# Patient Record
Sex: MALE | Race: WHITE | NOT HISPANIC OR LATINO | Employment: FULL TIME | URBAN - METROPOLITAN AREA
[De-identification: names, ages, dates, MRNs, and addresses within clinical notes are randomized per-mention and may not be internally consistent; named-entity substitution may affect disease eponyms.]

---

## 2017-02-16 ENCOUNTER — LAB VISIT (OUTPATIENT)
Dept: LAB | Facility: HOSPITAL | Age: 32
End: 2017-02-16
Attending: INTERNAL MEDICINE
Payer: COMMERCIAL

## 2017-02-16 DIAGNOSIS — E03.9 ACQUIRED HYPOTHYROIDISM: ICD-10-CM

## 2017-02-16 LAB
T4 FREE SERPL-MCNC: 1.04 NG/DL
TSH SERPL DL<=0.005 MIU/L-ACNC: 5.19 UIU/ML

## 2017-02-16 PROCEDURE — 36415 COLL VENOUS BLD VENIPUNCTURE: CPT

## 2017-02-16 PROCEDURE — 84439 ASSAY OF FREE THYROXINE: CPT

## 2017-02-16 PROCEDURE — 84443 ASSAY THYROID STIM HORMONE: CPT

## 2017-02-16 PROCEDURE — 86376 MICROSOMAL ANTIBODY EACH: CPT

## 2017-02-17 ENCOUNTER — PATIENT MESSAGE (OUTPATIENT)
Dept: ENDOCRINOLOGY | Facility: CLINIC | Age: 32
End: 2017-02-17

## 2017-02-17 LAB — THYROPEROXIDASE IGG SERPL-ACNC: 1121.6 IU/ML

## 2017-02-20 RX ORDER — LEVOTHYROXINE SODIUM 175 UG/1
175 TABLET ORAL DAILY
Qty: 90 TABLET | Refills: 0 | Status: SHIPPED | OUTPATIENT
Start: 2017-02-20 | End: 2017-03-20

## 2017-03-20 ENCOUNTER — OFFICE VISIT (OUTPATIENT)
Dept: ENDOCRINOLOGY | Facility: CLINIC | Age: 32
End: 2017-03-20
Payer: COMMERCIAL

## 2017-03-20 VITALS
BODY MASS INDEX: 31.12 KG/M2 | SYSTOLIC BLOOD PRESSURE: 124 MMHG | HEART RATE: 62 BPM | DIASTOLIC BLOOD PRESSURE: 68 MMHG | HEIGHT: 72 IN | WEIGHT: 229.75 LBS

## 2017-03-20 DIAGNOSIS — L50.9 URTICARIA: ICD-10-CM

## 2017-03-20 DIAGNOSIS — E06.3 HYPOTHYROIDISM DUE TO HASHIMOTO'S THYROIDITIS: ICD-10-CM

## 2017-03-20 DIAGNOSIS — T78.3XXD ANGIOEDEMA, SUBSEQUENT ENCOUNTER: ICD-10-CM

## 2017-03-20 DIAGNOSIS — E06.3 HASHIMOTO'S THYROIDITIS: Primary | ICD-10-CM

## 2017-03-20 DIAGNOSIS — E03.8 HYPOTHYROIDISM DUE TO HASHIMOTO'S THYROIDITIS: ICD-10-CM

## 2017-03-20 PROCEDURE — 1160F RVW MEDS BY RX/DR IN RCRD: CPT | Mod: S$GLB,,, | Performed by: INTERNAL MEDICINE

## 2017-03-20 PROCEDURE — 99999 PR PBB SHADOW E&M-EST. PATIENT-LVL III: CPT | Mod: PBBFAC,,, | Performed by: INTERNAL MEDICINE

## 2017-03-20 PROCEDURE — 99214 OFFICE O/P EST MOD 30 MIN: CPT | Mod: S$GLB,,, | Performed by: INTERNAL MEDICINE

## 2017-03-20 RX ORDER — LEVOTHYROXINE SODIUM 200 UG/1
200 TABLET ORAL DAILY
Qty: 90 TABLET | Refills: 3 | Status: SHIPPED | OUTPATIENT
Start: 2017-03-20 | End: 2018-03-20

## 2017-03-20 RX ORDER — LEVOTHYROXINE SODIUM 175 UG/1
175 TABLET ORAL DAILY
Qty: 90 TABLET | Refills: 3 | Status: CANCELLED | OUTPATIENT
Start: 2017-03-20 | End: 2018-03-20

## 2017-03-20 NOTE — PROGRESS NOTES
Subjective:      Patient ID: Steve Velasquez is a 32 y.o. male.    Chief Complaint:  Hypothyroidism      History of Present Illness  Previously seen by Dr. Santoyo  Urticaria  Hashimoto Hypothryoid  Allergy to Ibuprofen?    19 y/o Urticaria and still having problems. Initially went away.  21 y/o dxed with hypothryoidism  Currently 175mcg/d and increased to 8 tabs / week  Abs positive    [place] in gym  Weight up and down    Review of Systems   Constitutional: Positive for unexpected weight change. Negative for fatigue.   HENT: Negative for hearing loss.    Eyes: Negative for visual disturbance.   Respiratory: Negative for cough and shortness of breath.    Cardiovascular: Positive for chest pain. Negative for palpitations.   Gastrointestinal: Negative for constipation and diarrhea.   Endocrine: Negative for cold intolerance and heat intolerance.   Musculoskeletal: Negative for arthralgias and back pain.   Neurological: Positive for headaches.       Objective:   Physical Exam   Constitutional: He is oriented to person, place, and time. He appears well-developed and well-nourished.   Neck: Thyromegaly present.   Top normal   Cardiovascular: Normal rate, regular rhythm and normal heart sounds.    No murmur heard.  Musculoskeletal: Normal range of motion.   Neurological: He is alert and oriented to person, place, and time. He has normal reflexes.   Skin:   Urticaria arms   Psychiatric: His behavior is normal. Thought content normal.   Vitals reviewed.      Lab Review:   Results for orders placed or performed in visit on 02/16/17   TSH   Result Value Ref Range    TSH 5.192 (H) 0.400 - 4.000 uIU/mL   Thyroid peroxidase antibody   Result Value Ref Range    Thyroperoxidase Antibodies 1121.6 (H) <6.0 IU/mL   T4, free   Result Value Ref Range    Free T4 1.04 0.71 - 1.51 ng/dL         Assessment:     Hashimoto's thyroiditis and hypothyroidism and to increase to 200mcg/day  TSH level in 2 months  Urticaria  associated with hypothyroidism and goal of TSH of 0.4  Ibuprofen question if allergy avoid. Discussed nasal polyps and asthma. Note asthma as child    Plan:     No orders of the defined types were placed in this encounter.    Orders Placed This Encounter   Procedures    TSH     Standing Status:   Future     Standing Expiration Date:   3/20/2018

## 2017-03-20 NOTE — MR AVS SNAPSHOT
[facility]  [address]  [facility] [address]  Phone: [phone]  Fax: [phone]                  Steve Velasquez   3/20/2017 7:00 AM   Office Visit    Description:  Male : 1985   Provider:  Lavon Soler MD   Department:  [facility]           Reason for Visit     Hypothyroidism           Diagnoses this Visit        Comments    Hashimoto's thyroiditis    -  Primary     Hypothyroidism due to Hashimoto's thyroiditis         Urticaria         Angioedema, subsequent encounter                To Do List           Future Appointments        Provider Department Dept Phone    2017 2:00 PM LAB, APPOINTMENT [facility] [phone]      Goals (5 Years of Data)     None      Follow-Up and Disposition     Follow-up and Disposition History       These Medications        Disp Refills Start End    levothyroxine (SYNTHROID) 200 MCG tablet 90 tablet 3 3/20/2017 3/20/2018    Take 1 tablet (200 mcg total) by mouth once daily. - Oral    Pharmacy: [facility] [address] [place] [address] #: [phone]         [facility]     [facility] -  Assistance  Registered nurses in the [facility] provide clinical advisement, health education, appointment booking, and other advisory services.  Call for this free service at [phone].             Medications           Message regarding Medications     Verify the changes and/or additions to your medication regime listed below are the same as discussed with your clinician today.  If any of these changes or additions are incorrect, please notify your healthcare provider.        START taking these NEW medications        Refills    levothyroxine (SYNTHROID) 200 MCG tablet 3    Sig: Take 1 tablet (200 mcg total) by mouth once daily.    Class: Normal    Route: Oral      STOP taking these medications     cetirizine (ZYRTEC) 10 MG  tablet Take 10 mg by mouth 2 (two) times daily.     COCONUT OIL ORAL Take by mouth once daily.    hydrOXYzine HCl (ATARAX) 10 MG Tab Take 1 tablet (10 mg total) by mouth every 4 (four) hours as needed.    hydrOXYzine HCl (ATARAX) 25 MG tablet Take 1 tablet (25 mg total) by mouth 3 (three) times daily as needed for Itching.    multivitamin (THERAGRAN) per tablet Take 1 tablet by mouth once daily.           Verify that the below list of medications is an accurate representation of the medications you are currently taking.  If none reported, the list may be blank. If incorrect, please contact your healthcare provider. Carry this list with you in case of emergency.           Current Medications     cholecalciferol, vitamin D3, 5,000 unit Tab Take 5,000 Units by mouth once daily.    fish oil-omega-3 fatty acids 300-1,000 mg capsule Take 1 g by mouth once daily.    loratadine (CLARITIN) 10 mg tablet Take 10 mg by mouth 2 (two) times daily.     predniSONE (DELTASONE) 10 MG tablet Take 1 tablet (10 mg total) by mouth Daily. Day 1 60 mg Day 2 50 mg Day 3 40 mg Day 4 30 mg Day 5 20 mg Day 6 10 mg    selenium 50 mcg Tab Take 2 tablets (100 mcg total) by mouth once daily.    epinephrine (EPIPEN) 0.3 mg/0.3 mL AtIn Inject 0.3 mLs (0.3 mg total) into the muscle once.    levothyroxine (SYNTHROID) 200 MCG tablet Take 1 tablet (200 mcg total) by mouth once daily.           Clinical Reference Information           Your Vitals Were     BP Pulse Height Weight BMI    124/68 (BP Location: Left arm, Patient Position: Sitting, BP Method: Manual) 62 6' (1.829 m) 104.2 kg (229 lb 11.5 oz) 31.16 kg/m2      Blood Pressure          Most Recent Value    BP  124/68      Allergies as of 3/20/2017     Ibuprofen      Immunizations Administered on Date of Encounter - 3/20/2017     None      Orders Placed During Today's Visit     Future Labs/Procedures Expected by Expires    TSH  3/20/2017 3/20/2018      Instructions    Hashimoto's thyroiditis and  hypothyroidism and to increase to 200mcg/day  TSH level in 2 months  Urticaria associated with hypothyroidism and goal of TSH of 0.4  Ibuprofen question if allergy avoid. Discussed nasal polyps and asthma. Note asthma as child         Smoking Cessation     If you would like to quit smoking:   You may be eligible for free services if you are a [place] resident and started smoking cigarettes before September 1, 1988.  Call the [employer]oking [employer]essation [employer] ([facility]) toll free at [phone] [phone] or [phone].   Call 4-497-QUIT-NOW if you do not meet the above criteria.            Language Assistance Services     ATTENTION: Language assistance services are available, free of charge. Please call [phone].      ATENCIÓN: Si habla fabio, tiene a joy disposición servicios gratuitos de asistencia lingüística. Llame al [phone].     CHÚ Ý: N?u b?n nói Ti?ng Vi?t, có các d?ch v? h? tr? ngôn ng? mi?n phí dành cho b?n. G?i s? [phone].         [facility] complies with applicable Federal civil rights laws and does not discriminate on the basis of race, color, national origin, age, disability, or sex.

## 2017-03-31 ENCOUNTER — TELEPHONE (OUTPATIENT)
Dept: ENDOCRINOLOGY | Facility: CLINIC | Age: 32
End: 2017-03-31

## 2017-03-31 NOTE — TELEPHONE ENCOUNTER
Called Pt to inform him PA isn't needed, Insurance Company said his co-pay is $110 for 90 day supply and $37.98 for 30 day supply, patient verbalized understanding.

## 2017-10-18 ENCOUNTER — LAB VISIT (OUTPATIENT)
Dept: LAB | Facility: HOSPITAL | Age: 32
End: 2017-10-18
Attending: INTERNAL MEDICINE
Payer: COMMERCIAL

## 2017-10-18 DIAGNOSIS — E06.3 HASHIMOTO'S THYROIDITIS: ICD-10-CM

## 2017-10-18 LAB — TSH SERPL DL<=0.005 MIU/L-ACNC: 1.99 UIU/ML

## 2017-10-18 PROCEDURE — 36415 COLL VENOUS BLD VENIPUNCTURE: CPT

## 2017-10-18 PROCEDURE — 84443 ASSAY THYROID STIM HORMONE: CPT
